# Patient Record
Sex: FEMALE | Race: WHITE | NOT HISPANIC OR LATINO | Employment: FULL TIME | URBAN - METROPOLITAN AREA
[De-identification: names, ages, dates, MRNs, and addresses within clinical notes are randomized per-mention and may not be internally consistent; named-entity substitution may affect disease eponyms.]

---

## 2017-11-30 ENCOUNTER — CONVERSION ENCOUNTER (OUTPATIENT)
Dept: MAMMOGRAPHY | Facility: HOSPITAL | Age: 52
End: 2017-11-30

## 2018-01-30 ENCOUNTER — OFFICE VISIT CONVERTED (OUTPATIENT)
Dept: CARDIOLOGY | Facility: CLINIC | Age: 53
End: 2018-01-30
Attending: SPECIALIST

## 2018-02-27 ENCOUNTER — OFFICE VISIT CONVERTED (OUTPATIENT)
Dept: FAMILY MEDICINE CLINIC | Facility: CLINIC | Age: 53
End: 2018-02-27
Attending: NURSE PRACTITIONER

## 2018-06-05 ENCOUNTER — OFFICE VISIT CONVERTED (OUTPATIENT)
Dept: CARDIOLOGY | Facility: CLINIC | Age: 53
End: 2018-06-05
Attending: SPECIALIST

## 2019-03-08 ENCOUNTER — HOSPITAL ENCOUNTER (OUTPATIENT)
Dept: LAB | Facility: HOSPITAL | Age: 54
Discharge: HOME | End: 2019-03-08
Attending: NURSE PRACTITIONER

## 2019-03-08 ENCOUNTER — OFFICE VISIT CONVERTED (OUTPATIENT)
Dept: FAMILY MEDICINE CLINIC | Facility: CLINIC | Age: 54
End: 2019-03-08
Attending: NURSE PRACTITIONER

## 2019-03-08 LAB
ALBUMIN SERPL-MCNC: 4.3 G/DL (ref 3.5–5)
ALBUMIN/GLOB SERPL: 1.5 {RATIO} (ref 1.4–2.6)
ALP SERPL-CCNC: 120 U/L (ref 53–141)
ALT SERPL-CCNC: 104 U/L (ref 10–40)
ANION GAP SERPL CALC-SCNC: 17 MMOL/L (ref 8–19)
AST SERPL-CCNC: 50 U/L (ref 15–50)
BASOPHILS # BLD AUTO: 0.02 10*3/UL (ref 0–0.2)
BASOPHILS NFR BLD AUTO: 0.3 % (ref 0–3)
BILIRUB SERPL-MCNC: 0.36 MG/DL (ref 0.2–1.3)
BUN SERPL-MCNC: 17 MG/DL (ref 5–25)
BUN/CREAT SERPL: 18 {RATIO} (ref 6–20)
CALCIUM SERPL-MCNC: 9.1 MG/DL (ref 8.7–10.4)
CHLORIDE SERPL-SCNC: 105 MMOL/L (ref 99–111)
CHOLEST SERPL-MCNC: 185 MG/DL (ref 107–200)
CHOLEST/HDLC SERPL: 3.4 {RATIO} (ref 3–6)
CONV ABS IMM GRAN: 0.03 10*3/UL (ref 0–0.2)
CONV CO2: 24 MMOL/L (ref 22–32)
CONV IMMATURE GRAN: 0.4 % (ref 0–1.8)
CONV TOTAL PROTEIN: 7.2 G/DL (ref 6.3–8.2)
CREAT UR-MCNC: 0.97 MG/DL (ref 0.5–0.9)
DEPRECATED RDW RBC AUTO: 42.2 FL (ref 36.4–46.3)
EOSINOPHIL # BLD AUTO: 0.1 10*3/UL (ref 0–0.7)
EOSINOPHIL # BLD AUTO: 1.4 % (ref 0–7)
ERYTHROCYTE [DISTWIDTH] IN BLOOD BY AUTOMATED COUNT: 12.2 % (ref 11.7–14.4)
GFR SERPLBLD BASED ON 1.73 SQ M-ARVRAT: >60 ML/MIN/{1.73_M2}
GLOBULIN UR ELPH-MCNC: 2.9 G/DL (ref 2–3.5)
GLUCOSE SERPL-MCNC: 103 MG/DL (ref 65–99)
HBA1C MFR BLD: 13.7 G/DL (ref 12–16)
HCT VFR BLD AUTO: 41.4 % (ref 37–47)
HDLC SERPL-MCNC: 54 MG/DL (ref 40–60)
LDLC SERPL CALC-MCNC: 100 MG/DL (ref 70–100)
LYMPHOCYTES # BLD AUTO: 2.67 10*3/UL (ref 1–5)
MCH RBC QN AUTO: 31 PG (ref 27–31)
MCHC RBC AUTO-ENTMCNC: 33.1 G/DL (ref 33–37)
MCV RBC AUTO: 93.7 FL (ref 81–99)
MONOCYTES # BLD AUTO: 0.62 10*3/UL (ref 0.2–1.2)
MONOCYTES NFR BLD AUTO: 8.9 % (ref 3–10)
NEUTROPHILS # BLD AUTO: 3.54 10*3/UL (ref 2–8)
NEUTROPHILS NFR BLD AUTO: 50.7 % (ref 30–85)
NRBC CBCN: 0 % (ref 0–0.7)
OSMOLALITY SERPL CALC.SUM OF ELEC: 296 MOSM/KG (ref 273–304)
PLATELET # BLD AUTO: 287 10*3/UL (ref 130–400)
PMV BLD AUTO: 10.9 FL (ref 9.4–12.3)
POTASSIUM SERPL-SCNC: 4.3 MMOL/L (ref 3.5–5.3)
RBC # BLD AUTO: 4.42 10*6/UL (ref 4.2–5.4)
SODIUM SERPL-SCNC: 142 MMOL/L (ref 135–147)
T4 FREE SERPL-MCNC: 1.1 NG/DL (ref 0.9–1.8)
TRIGL SERPL-MCNC: 155 MG/DL (ref 40–150)
TSH SERPL-ACNC: 2.38 M[IU]/L (ref 0.27–4.2)
VARIANT LYMPHS NFR BLD MANUAL: 38.3 % (ref 20–45)
VLDLC SERPL-MCNC: 31 MG/DL (ref 5–37)
WBC # BLD AUTO: 6.98 10*3/UL (ref 4.8–10.8)

## 2019-03-18 ENCOUNTER — HOSPITAL ENCOUNTER (OUTPATIENT)
Dept: LAB | Facility: HOSPITAL | Age: 54
Discharge: HOME | End: 2019-03-18
Attending: NURSE PRACTITIONER

## 2019-03-18 LAB
FERRITIN SERPL-MCNC: 53 NG/ML (ref 10–200)
INR PPP: 0.95 (ref 2–3)
IRON SATN MFR SERPL: 17 % (ref 20–55)
IRON SERPL-MCNC: 77 UG/DL (ref 60–170)
PROTHROMBIN TIME: 10 S (ref 9.4–12)
TIBC SERPL-MCNC: 458 UG/DL (ref 245–450)
TRANSFERRIN SERPL-MCNC: 320 MG/DL (ref 250–380)

## 2019-03-19 LAB
CERULOPLASMIN SERPL-MCNC: 33 MG/DL (ref 19–39)
CONV ANTI NUCLEAR ANTIBODY WITH REFLEX: NEGATIVE
CONV HEPATITIS B SURFACE AG W CONFIRMATION RE: NEGATIVE
CONV IMMUNOGLOBULIN G (IGG): 781 MG/DL (ref 700–1600)
CONV IMMUNOGLOBULIN M (IGM): 33 MG/DL (ref 26–217)
DEPRECATED MITOCHONDRIA M2 IGG SER-ACNC: <20 UNITS (ref 0–20)
GLIADIN PEPTIDE+TTG IGA+IGG SER QL IA: <1 UNITS (ref 0–20)
HBV CORE IGM SERPL QL IA: NEGATIVE
HBV E AB SERPL QL IA: NEGATIVE
HBV E AG SERPL QL IA: NEGATIVE
HBV SURFACE AB SER QL: NON REACTIVE
IGA SERPL-MCNC: 83 MG/DL (ref 87–352)
IGE SERPL-ACNC: 27 K[IU]/ML (ref 0–24)
SMOOTH MUSCLE F-ACTIN AB IGG: 3 UNITS (ref 0–19)

## 2019-03-22 LAB
A1AT SERPL-MCNC: 138 MG/DL (ref 90–200)
PHENOTYPE: NORMAL

## 2019-04-30 ENCOUNTER — HOSPITAL ENCOUNTER (OUTPATIENT)
Dept: URGENT CARE | Facility: CLINIC | Age: 54
Discharge: HOME | End: 2019-04-30
Attending: NURSE PRACTITIONER

## 2019-06-25 ENCOUNTER — OFFICE VISIT CONVERTED (OUTPATIENT)
Dept: CARDIOLOGY | Facility: CLINIC | Age: 54
End: 2019-06-25
Attending: SPECIALIST

## 2019-06-25 ENCOUNTER — CONVERSION ENCOUNTER (OUTPATIENT)
Dept: CARDIOLOGY | Facility: CLINIC | Age: 54
End: 2019-06-25

## 2019-08-21 ENCOUNTER — OFFICE VISIT CONVERTED (OUTPATIENT)
Dept: FAMILY MEDICINE CLINIC | Facility: CLINIC | Age: 54
End: 2019-08-21
Attending: NURSE PRACTITIONER

## 2021-05-11 ENCOUNTER — HOSPITAL ENCOUNTER (OUTPATIENT)
Dept: GENERAL RADIOLOGY | Facility: HOSPITAL | Age: 56
Discharge: HOME | End: 2021-05-11
Attending: PHYSICIAN ASSISTANT

## 2021-05-15 VITALS
OXYGEN SATURATION: 100 % | HEART RATE: 88 BPM | HEIGHT: 65 IN | BODY MASS INDEX: 29.37 KG/M2 | DIASTOLIC BLOOD PRESSURE: 74 MMHG | WEIGHT: 176.31 LBS | RESPIRATION RATE: 16 BRPM | TEMPERATURE: 97.2 F | SYSTOLIC BLOOD PRESSURE: 162 MMHG

## 2021-05-15 VITALS
DIASTOLIC BLOOD PRESSURE: 60 MMHG | WEIGHT: 176 LBS | HEART RATE: 90 BPM | HEIGHT: 65 IN | SYSTOLIC BLOOD PRESSURE: 126 MMHG | BODY MASS INDEX: 29.32 KG/M2

## 2021-05-15 VITALS
RESPIRATION RATE: 16 BRPM | DIASTOLIC BLOOD PRESSURE: 58 MMHG | WEIGHT: 176 LBS | OXYGEN SATURATION: 97 % | SYSTOLIC BLOOD PRESSURE: 134 MMHG | BODY MASS INDEX: 29.32 KG/M2 | HEART RATE: 75 BPM | TEMPERATURE: 96.8 F | HEIGHT: 65 IN

## 2021-05-16 VITALS
SYSTOLIC BLOOD PRESSURE: 138 MMHG | DIASTOLIC BLOOD PRESSURE: 76 MMHG | HEIGHT: 65 IN | BODY MASS INDEX: 28.82 KG/M2 | WEIGHT: 173 LBS | HEART RATE: 68 BPM

## 2021-05-16 VITALS
SYSTOLIC BLOOD PRESSURE: 134 MMHG | RESPIRATION RATE: 16 BRPM | OXYGEN SATURATION: 98 % | DIASTOLIC BLOOD PRESSURE: 50 MMHG | WEIGHT: 174.31 LBS | HEIGHT: 65 IN | HEART RATE: 73 BPM | BODY MASS INDEX: 29.04 KG/M2 | TEMPERATURE: 97 F

## 2021-05-16 VITALS
BODY MASS INDEX: 29.32 KG/M2 | DIASTOLIC BLOOD PRESSURE: 70 MMHG | SYSTOLIC BLOOD PRESSURE: 120 MMHG | HEIGHT: 65 IN | WEIGHT: 176 LBS | HEART RATE: 84 BPM

## 2021-07-05 RX ORDER — LOSARTAN POTASSIUM 25 MG/1
TABLET ORAL
Qty: 90 TABLET | Refills: 3 | Status: SHIPPED | OUTPATIENT
Start: 2021-07-05 | End: 2022-04-06 | Stop reason: ALTCHOICE

## 2021-11-11 ENCOUNTER — OFFICE VISIT (OUTPATIENT)
Dept: OBSTETRICS AND GYNECOLOGY | Facility: CLINIC | Age: 56
End: 2021-11-11

## 2021-11-11 VITALS
SYSTOLIC BLOOD PRESSURE: 146 MMHG | WEIGHT: 177.4 LBS | HEIGHT: 66 IN | BODY MASS INDEX: 28.51 KG/M2 | HEART RATE: 65 BPM | DIASTOLIC BLOOD PRESSURE: 80 MMHG

## 2021-11-11 DIAGNOSIS — N95.1 VASOMOTOR SYMPTOMS DUE TO MENOPAUSE: ICD-10-CM

## 2021-11-11 DIAGNOSIS — Z01.419 WELL WOMAN EXAM WITH ROUTINE GYNECOLOGICAL EXAM: Primary | ICD-10-CM

## 2021-11-11 DIAGNOSIS — N95.2 VAGINAL ATROPHY: ICD-10-CM

## 2021-11-11 PROCEDURE — G0123 SCREEN CERV/VAG THIN LAYER: HCPCS | Performed by: OBSTETRICS & GYNECOLOGY

## 2021-11-11 PROCEDURE — 99396 PREV VISIT EST AGE 40-64: CPT | Performed by: OBSTETRICS & GYNECOLOGY

## 2021-11-11 RX ORDER — ESTRADIOL 10 UG/1
1 INSERT VAGINAL 2 TIMES WEEKLY
Qty: 25.71 TABLET | Refills: 3 | Status: SHIPPED | OUTPATIENT
Start: 2021-11-11 | End: 2022-06-02

## 2021-11-11 RX ORDER — ASPIRIN 81 MG/1
TABLET ORAL
COMMUNITY

## 2021-11-11 RX ORDER — ESTRADIOL AND NORETHINDRONE ACETATE 1; .5 MG/1; MG/1
1 TABLET ORAL DAILY
Qty: 90 TABLET | Refills: 3 | Status: SHIPPED | OUTPATIENT
Start: 2021-11-11 | End: 2022-11-17

## 2021-11-11 RX ORDER — ESTRADIOL AND NORETHINDRONE ACETATE 1; .5 MG/1; MG/1
1 TABLET ORAL DAILY
COMMUNITY
End: 2021-11-11 | Stop reason: SDUPTHER

## 2021-11-11 RX ORDER — LOSARTAN POTASSIUM 25 MG/1
TABLET ORAL
COMMUNITY
Start: 2021-03-15 | End: 2022-04-06 | Stop reason: ALTCHOICE

## 2021-11-14 NOTE — PROGRESS NOTES
"Well Woman Visit    CC: WWE    Myriad intake: No  Previously Qualified? NO   Tobacco/Nicotine use:  No    HPI:   56 y.o. Contraception or HRT: Post menopausal    Pt has no complaints today.      History: PMHx, Meds, Allergies, PSHx, Social Hx, and POBHx all reviewed and updated.  PCP: does manage PMHx and preventative labs    Review of Systems   All other systems reviewed and are negative.       /80   Pulse 65   Ht 167.6 cm (66\")   Wt 80.5 kg (177 lb 6.4 oz)   LMP  (LMP Unknown)   Breastfeeding No   BMI 28.63 kg/m²     Physical Exam  Vitals and nursing note reviewed. Exam conducted with a chaperone present.   Constitutional:       Appearance: Normal appearance.   Neck:      Thyroid: No thyroid mass or thyromegaly.   Cardiovascular:      Rate and Rhythm: Normal rate and regular rhythm.      Heart sounds: Normal heart sounds.   Pulmonary:      Effort: Pulmonary effort is normal.      Breath sounds: Normal breath sounds.   Chest:   Breasts:      Right: Normal. No mass, nipple discharge or skin change.      Left: Normal. No mass, nipple discharge or skin change.       Abdominal:      General: Abdomen is flat. Bowel sounds are normal.      Palpations: Abdomen is soft.   Genitourinary:     General: Normal vulva.      Exam position: Lithotomy position.      Vagina: Normal.      Cervix: Normal.      Uterus: Normal.       Adnexa: Right adnexa normal and left adnexa normal.   Musculoskeletal:      Cervical back: Full passive range of motion without pain.   Neurological:      Mental Status: She is alert.         ASSESSMENT AND PLAN:  WWE    Diagnoses and all orders for this visit:    1. Well woman exam with routine gynecological exam (Primary)  -     Mammo Screening Digital Tomosynthesis Bilateral With CAD; Future  -     IGP,rfx Aptima HPV All Pth    2. Vasomotor symptoms due to menopause  Comments:  Counseled regarding the R/B of HRT, WHI study.  Pt would like to continue HRT    3. Vaginal " atrophy  Comments:  Wants to conitnue Vagifem    Other orders  -     estradiol-norethindrone (ACTIVELLA) 1-0.5 MG per tablet; Take 1 tablet by mouth Daily.  Dispense: 90 tablet; Refill: 3  -     estradiol (Vagifem) 10 MCG tablet vaginal tablet; Insert 1 tablet into the vagina 2 (Two) Times a Week.  Dispense: 25.71 tablet; Refill: 3        Counseling:     HRT R/B/A/SE/E all options including non-FDA approved options reviewed    Preventative:   MMG            She understands the importance of having any ordered tests to be performed in a timely fashion.  The risks of not performing them include, but are not limited to, advanced cancer stages, bone loss from osteoporosis and/or subsequent increase in morbidity and/or mortality.  She is encouraged to review her results online and/or contact or office if she has questions.     Follow Up:  Return in about 1 year (around 11/11/2022).        Kathy Alvarez MD  11/11/2021

## 2021-11-17 LAB
CONV .: NORMAL
CYTOLOGIST CVX/VAG CYTO: NORMAL
CYTOLOGY CVX/VAG DOC CYTO: NORMAL
CYTOLOGY CVX/VAG DOC THIN PREP: NORMAL
DX ICD CODE: NORMAL
HIV 1 & 2 AB SER-IMP: NORMAL
OTHER STN SPEC: NORMAL
STAT OF ADQ CVX/VAG CYTO-IMP: NORMAL

## 2022-01-25 ENCOUNTER — APPOINTMENT (OUTPATIENT)
Dept: MAMMOGRAPHY | Facility: HOSPITAL | Age: 57
End: 2022-01-25

## 2022-03-01 ENCOUNTER — APPOINTMENT (OUTPATIENT)
Dept: MAMMOGRAPHY | Facility: HOSPITAL | Age: 57
End: 2022-03-01

## 2022-04-04 PROBLEM — I10 ESSENTIAL HYPERTENSION: Status: ACTIVE | Noted: 2017-04-26

## 2022-04-04 RX ORDER — ATORVASTATIN CALCIUM 20 MG/1
20 TABLET, FILM COATED ORAL DAILY
COMMUNITY
End: 2022-06-02

## 2022-04-06 ENCOUNTER — OFFICE VISIT (OUTPATIENT)
Dept: FAMILY MEDICINE CLINIC | Facility: CLINIC | Age: 57
End: 2022-04-06

## 2022-04-06 VITALS
WEIGHT: 177.2 LBS | OXYGEN SATURATION: 99 % | BODY MASS INDEX: 28.48 KG/M2 | HEIGHT: 66 IN | HEART RATE: 97 BPM | TEMPERATURE: 97.7 F | SYSTOLIC BLOOD PRESSURE: 154 MMHG | DIASTOLIC BLOOD PRESSURE: 90 MMHG

## 2022-04-06 DIAGNOSIS — I10 PRIMARY HYPERTENSION: Primary | ICD-10-CM

## 2022-04-06 DIAGNOSIS — Z13.220 SCREENING FOR LIPID DISORDERS: ICD-10-CM

## 2022-04-06 PROCEDURE — 99213 OFFICE O/P EST LOW 20 MIN: CPT | Performed by: NURSE PRACTITIONER

## 2022-04-06 RX ORDER — METOPROLOL SUCCINATE 25 MG/1
25 TABLET, EXTENDED RELEASE ORAL DAILY
Qty: 90 TABLET | Refills: 1 | Status: SHIPPED | OUTPATIENT
Start: 2022-04-06 | End: 2022-09-14

## 2022-04-06 NOTE — PROGRESS NOTES
"Chief Complaint  Hypertension    Subjective          Maira Arana presents to [facility]  She presents to the office today to discuss her blood pressure.  Blood pressure on today is 154/90.  She denies any chest pain shortness breath palpitations at this time.  She states that she does not feel the losartan is doing an adequate job.  She is requesting to go back to metoprolol.  I did asked the patient to continue to log her blood pressures and let us know in 2 weeks how her blood pressure is running.  I also explained that she is due for routine lab work.      Objective   Vital Signs:   /90 (BP Location: Right arm, Patient Position: Sitting, Cuff Size: Adult)   Pulse 97   Temp 97.7 °F (36.5 °C) (Temporal)   Ht 167.6 cm (65.98\")   Wt 80.4 kg (177 lb 3.2 oz)   SpO2 99%   BMI 28.61 kg/m²     BMI is above normal parameters. Recommendations: none       Physical Exam  Vitals reviewed.   Constitutional:       Appearance: Normal appearance.   Cardiovascular:      Rate and Rhythm: Normal rate and regular rhythm.      Heart sounds: Normal heart sounds, S1 normal and S2 normal. No murmur heard.  Pulmonary:      Effort: Pulmonary effort is normal. No respiratory distress.      Breath sounds: Normal breath sounds.   Skin:     General: Skin is warm and dry.   Neurological:      Mental Status: She is alert and oriented to person, place, and time.   Psychiatric:         Attention and Perception: Attention normal.         Mood and Affect: Mood normal.         Behavior: Behavior normal.        Result Review :                Assessment and Plan    Diagnoses and all orders for this visit:    1. Primary hypertension (Primary)  -     metoprolol succinate XL (Toprol XL) 25 MG 24 hr tablet; Take 1 tablet by mouth Daily.  Dispense: 90 tablet; Refill: 1  -     CBC (No Diff); Future  -     Comprehensive Metabolic Panel; Future    2. Screening for lipid disorders  -     Lipid Panel; " Future        Follow Up   Return in about 6 months (around 10/6/2022) for Recheck.  Patient was given instructions and counseling regarding her condition or for health maintenance advice. Please see specific information pulled into the AVS if appropriate.

## 2022-04-07 ENCOUNTER — LAB (OUTPATIENT)
Dept: LAB | Facility: HOSPITAL | Age: 57
End: 2022-04-07

## 2022-04-07 DIAGNOSIS — I10 PRIMARY HYPERTENSION: ICD-10-CM

## 2022-04-07 DIAGNOSIS — Z13.220 SCREENING FOR LIPID DISORDERS: ICD-10-CM

## 2022-04-07 LAB
ALBUMIN SERPL-MCNC: 4.4 G/DL (ref 3.5–5.2)
ALBUMIN/GLOB SERPL: 1.8 G/DL
ALP SERPL-CCNC: 70 U/L (ref 39–117)
ALT SERPL W P-5'-P-CCNC: 19 U/L (ref 1–33)
ANION GAP SERPL CALCULATED.3IONS-SCNC: 9 MMOL/L (ref 5–15)
AST SERPL-CCNC: 23 U/L (ref 1–32)
BILIRUB SERPL-MCNC: 0.2 MG/DL (ref 0–1.2)
BUN SERPL-MCNC: 15 MG/DL (ref 6–20)
BUN/CREAT SERPL: 18.5 (ref 7–25)
CALCIUM SPEC-SCNC: 9.1 MG/DL (ref 8.6–10.5)
CHLORIDE SERPL-SCNC: 106 MMOL/L (ref 98–107)
CHOLEST SERPL-MCNC: 199 MG/DL (ref 0–200)
CO2 SERPL-SCNC: 24 MMOL/L (ref 22–29)
CREAT SERPL-MCNC: 0.81 MG/DL (ref 0.57–1)
DEPRECATED RDW RBC AUTO: 44.7 FL (ref 37–54)
EGFRCR SERPLBLD CKD-EPI 2021: 86.4 ML/MIN/1.73
ERYTHROCYTE [DISTWIDTH] IN BLOOD BY AUTOMATED COUNT: 13.8 % (ref 12.3–15.4)
GLOBULIN UR ELPH-MCNC: 2.5 GM/DL
GLUCOSE SERPL-MCNC: 100 MG/DL (ref 65–99)
HCT VFR BLD AUTO: 40.9 % (ref 34–46.6)
HDLC SERPL-MCNC: 45 MG/DL (ref 40–60)
HGB BLD-MCNC: 13.7 G/DL (ref 12–15.9)
LDLC SERPL CALC-MCNC: 122 MG/DL (ref 0–100)
LDLC/HDLC SERPL: 2.63 {RATIO}
MCH RBC QN AUTO: 29.7 PG (ref 26.6–33)
MCHC RBC AUTO-ENTMCNC: 33.5 G/DL (ref 31.5–35.7)
MCV RBC AUTO: 88.5 FL (ref 79–97)
PLATELET # BLD AUTO: 317 10*3/MM3 (ref 140–450)
PMV BLD AUTO: 11.4 FL (ref 6–12)
POTASSIUM SERPL-SCNC: 4.5 MMOL/L (ref 3.5–5.2)
PROT SERPL-MCNC: 6.9 G/DL (ref 6–8.5)
RBC # BLD AUTO: 4.62 10*6/MM3 (ref 3.77–5.28)
SODIUM SERPL-SCNC: 139 MMOL/L (ref 136–145)
TRIGL SERPL-MCNC: 179 MG/DL (ref 0–150)
VLDLC SERPL-MCNC: 32 MG/DL (ref 5–40)
WBC NRBC COR # BLD: 8.84 10*3/MM3 (ref 3.4–10.8)

## 2022-04-07 PROCEDURE — 80061 LIPID PANEL: CPT

## 2022-04-07 PROCEDURE — 85027 COMPLETE CBC AUTOMATED: CPT

## 2022-04-07 PROCEDURE — 36415 COLL VENOUS BLD VENIPUNCTURE: CPT

## 2022-04-07 PROCEDURE — 80053 COMPREHEN METABOLIC PANEL: CPT

## 2022-05-10 ENCOUNTER — TELEPHONE (OUTPATIENT)
Dept: OBSTETRICS AND GYNECOLOGY | Facility: CLINIC | Age: 57
End: 2022-05-10

## 2022-05-10 NOTE — TELEPHONE ENCOUNTER
Received a fax from [facility].  They state they received two Rx for patient Activella & Vagifem they are questioning if patient should be on both.  Last seen 11-11-21 Rx sent Activella #90 with 3 refills & Vagifem  25 with 3 refills.

## 2022-05-13 ENCOUNTER — APPOINTMENT (OUTPATIENT)
Dept: MAMMOGRAPHY | Facility: HOSPITAL | Age: 57
End: 2022-05-13

## 2022-06-02 ENCOUNTER — OFFICE VISIT (OUTPATIENT)
Dept: CARDIOLOGY | Facility: CLINIC | Age: 57
End: 2022-06-02

## 2022-06-02 VITALS
HEART RATE: 76 BPM | WEIGHT: 174 LBS | SYSTOLIC BLOOD PRESSURE: 198 MMHG | BODY MASS INDEX: 27.97 KG/M2 | HEIGHT: 66 IN | DIASTOLIC BLOOD PRESSURE: 92 MMHG

## 2022-06-02 DIAGNOSIS — I10 ESSENTIAL HYPERTENSION: Primary | ICD-10-CM

## 2022-06-02 DIAGNOSIS — R00.2 PALPITATIONS: ICD-10-CM

## 2022-06-02 DIAGNOSIS — R07.89 OTHER CHEST PAIN: ICD-10-CM

## 2022-06-02 PROCEDURE — 99214 OFFICE O/P EST MOD 30 MIN: CPT | Performed by: INTERNAL MEDICINE

## 2022-06-02 RX ORDER — LOSARTAN POTASSIUM 25 MG/1
TABLET ORAL
COMMUNITY
Start: 2022-04-20 | End: 2022-06-02

## 2022-06-02 RX ORDER — LOSARTAN POTASSIUM AND HYDROCHLOROTHIAZIDE 12.5; 5 MG/1; MG/1
1 TABLET ORAL DAILY
Qty: 90 TABLET | Refills: 3 | Status: SHIPPED | OUTPATIENT
Start: 2022-06-02

## 2022-06-02 NOTE — PROGRESS NOTES
"Chief Complaint  Hypertension and Rapid Heart Rate    Subjective      Patient returns clinic to follow-up on hypertension.  Her blood pressure has been quite elevated around 200 mmHg systolic.  It is associated with headache/fullness sensation in her head.  She was recently started on losartan by her PCP but she has been only taking it as needed.  In addition, she is on metoprolol XL 25 mg nightly.  She complains of occasional palpitations associated with chest tightness.  The symptoms are exacerbated by physical activities.  They are self-limited.  She has no other complaints or concerns today.      Past Medical History:   Diagnosis Date   • Cancer (HCC)    • Heart murmur    • Heart valve disease    • Hypertension          Current Outpatient Medications:   •  aspirin 81 MG EC tablet, aspirin 81 mg oral tablet,delayed release (DR/EC) take 1 tablet (81 mg) by oral route once daily   Active, Disp: , Rfl:   •  estradiol-norethindrone (ACTIVELLA) 1-0.5 MG per tablet, Take 1 tablet by mouth Daily., Disp: 90 tablet, Rfl: 3  •  metoprolol succinate XL (Toprol XL) 25 MG 24 hr tablet, Take 1 tablet by mouth Daily., Disp: 90 tablet, Rfl: 1  •  losartan-hydrochlorothiazide (Hyzaar) 50-12.5 MG per tablet, Take 1 tablet by mouth Daily., Disp: 90 tablet, Rfl: 3    Medications Discontinued During This Encounter   Medication Reason   • estradiol (Vagifem) 10 MCG tablet vaginal tablet    • atorvastatin (LIPITOR) 20 MG tablet    • losartan (COZAAR) 25 MG tablet      No Known Allergies     Social History     Tobacco Use   • Smoking status: Never Smoker   • Smokeless tobacco: Never Used   Vaping Use   • Vaping Use: Never used   Substance Use Topics   • Alcohol use: Never   • Drug use: Never       Family History   Problem Relation Age of Onset   • Polycystic kidney disease Mother    • Kidney failure Mother         Objective     BP (!) 198/92 (BP Location: Right arm)   Pulse 76   Ht 167.6 cm (66\")   Wt 78.9 kg (174 lb)   BMI 28.08 " kg/m²       Physical Exam    General Appearance:   · no acute distress  · Alert and oriented x3  HENT:   · lips not cyanotic  · Atraumatic  Neck:  · No jvd   · supple  Respiratory:  · no respiratory distress  · normal breath sounds  · no rales  Cardiovascular:  · no S3, no S4   · no murmur  · no rub  Extremities  · No cyanosis  · lower extremity edema: none    Skin:   · warm, dry  · No rashes      Result Review :     No results found for: PROBNP  CMP    CMP 4/7/22   Glucose 100 (A)   BUN 15   Creatinine 0.81   Sodium 139   Potassium 4.5   Chloride 106   Calcium 9.1   Albumin 4.40   Total Bilirubin 0.2   Alkaline Phosphatase 70   AST (SGOT) 23   ALT (SGPT) 19   (A) Abnormal value       Comments are available for some flowsheets but are not being displayed.           CBC w/diff    CBC w/Diff 4/7/22   WBC 8.84   RBC 4.62   Hemoglobin 13.7   Hematocrit 40.9   MCV 88.5   MCH 29.7   MCHC 33.5   RDW 13.8   Platelets 317            Lab Results   Component Value Date    TSH 2.380 03/08/2019      Lab Results   Component Value Date    FREET4 1.1 03/08/2019      No results found for: DDIMERQUANT  No results found for: MG   No results found for: DIGOXIN   No results found for: TROPONINT        Lipid Panel    Lipid Panel 4/7/22   Total Cholesterol 199   Triglycerides 179 (A)   HDL Cholesterol 45   VLDL Cholesterol 32   LDL Cholesterol  122 (A)   LDL/HDL Ratio 2.63   (A) Abnormal value            No results found for: POCTROP                   Diagnoses and all orders for this visit:    1. Essential hypertension (Primary)  -     losartan-hydrochlorothiazide (Hyzaar) 50-12.5 MG per tablet; Take 1 tablet by mouth Daily.  Dispense: 90 tablet; Refill: 3  -     Basic Metabolic Panel; Future    2. Palpitations  -     Holter Monitor - 48 Hour; Future    3. Other chest pain  -     Adult Transthoracic Echo Complete W/ Cont if Necessary Per Protocol; Future  -     Treadmill Stress Test; Future      Assessment:    -Hypertension: Her  medications will be adjusted as above.  BMP will be checked in 1 week.  Continue blood pressure monitoring.  We will reevaluate with her upcoming follow-up visit.    -Palpitations: She has been having palpitations associated with chest discomfort.  It is exacerbated by physical activities.  Treadmill stress test will be done to rule out exercise-induced ischemic ST-T changes/arrhythmias.  Echo will be done for assessment of LVEF, wall motion and valvular function.  48-hour Holter monitor to be done to assess for ectopy/tachyarrhythmias.  Further recommendations to follow.      Follow Up     No follow-ups on file.        Patient was given instructions and counseling regarding her condition or for health maintenance advice. Please see specific information pulled into the AVS if appropriate.

## 2022-06-07 ENCOUNTER — HOSPITAL ENCOUNTER (OUTPATIENT)
Dept: MAMMOGRAPHY | Facility: HOSPITAL | Age: 57
Discharge: HOME | End: 2022-06-07
Admitting: OBSTETRICS & GYNECOLOGY

## 2022-06-07 DIAGNOSIS — Z01.419 WELL WOMAN EXAM WITH ROUTINE GYNECOLOGICAL EXAM: ICD-10-CM

## 2022-06-07 PROCEDURE — 77067 SCR MAMMO BI INCL CAD: CPT

## 2022-06-07 PROCEDURE — 77063 BREAST TOMOSYNTHESIS BI: CPT

## 2022-06-09 ENCOUNTER — LAB (OUTPATIENT)
Dept: LAB | Facility: HOSPITAL | Age: 57
End: 2022-06-09

## 2022-06-09 DIAGNOSIS — I10 ESSENTIAL HYPERTENSION: ICD-10-CM

## 2022-06-09 LAB
ANION GAP SERPL CALCULATED.3IONS-SCNC: 11.7 MMOL/L (ref 5–15)
BUN SERPL-MCNC: 15 MG/DL (ref 6–20)
BUN/CREAT SERPL: 14.2 (ref 7–25)
CALCIUM SPEC-SCNC: 9.6 MG/DL (ref 8.6–10.5)
CHLORIDE SERPL-SCNC: 102 MMOL/L (ref 98–107)
CO2 SERPL-SCNC: 26.3 MMOL/L (ref 22–29)
CREAT SERPL-MCNC: 1.06 MG/DL (ref 0.57–1)
EGFRCR SERPLBLD CKD-EPI 2021: 62.2 ML/MIN/1.73
GLUCOSE SERPL-MCNC: 97 MG/DL (ref 65–99)
POTASSIUM SERPL-SCNC: 4.3 MMOL/L (ref 3.5–5.2)
SODIUM SERPL-SCNC: 140 MMOL/L (ref 136–145)

## 2022-06-09 PROCEDURE — 36415 COLL VENOUS BLD VENIPUNCTURE: CPT

## 2022-06-09 PROCEDURE — 80048 BASIC METABOLIC PNL TOTAL CA: CPT

## 2022-08-10 ENCOUNTER — TELEPHONE (OUTPATIENT)
Dept: CARDIOLOGY | Facility: CLINIC | Age: 57
End: 2022-08-10

## 2022-09-02 ENCOUNTER — OFFICE VISIT (OUTPATIENT)
Dept: CARDIOLOGY | Facility: CLINIC | Age: 57
End: 2022-09-02

## 2022-09-02 VITALS
SYSTOLIC BLOOD PRESSURE: 159 MMHG | HEIGHT: 66 IN | DIASTOLIC BLOOD PRESSURE: 75 MMHG | HEART RATE: 86 BPM | WEIGHT: 178.2 LBS | BODY MASS INDEX: 28.64 KG/M2

## 2022-09-02 DIAGNOSIS — I10 ESSENTIAL HYPERTENSION: ICD-10-CM

## 2022-09-02 DIAGNOSIS — R07.89 OTHER CHEST PAIN: Primary | ICD-10-CM

## 2022-09-02 DIAGNOSIS — R00.2 PALPITATIONS: ICD-10-CM

## 2022-09-02 PROCEDURE — 99214 OFFICE O/P EST MOD 30 MIN: CPT | Performed by: INTERNAL MEDICINE

## 2022-09-02 RX ORDER — AMLODIPINE BESYLATE 5 MG/1
5 TABLET ORAL DAILY
Qty: 90 TABLET | Refills: 3 | Status: SHIPPED | OUTPATIENT
Start: 2022-09-02

## 2022-09-02 NOTE — PROGRESS NOTES
"Chief Complaint  Hypertension, Leg Swelling, and Fatigue    Subjective      Patient returns clinic to follow-up on recent testing which was done for chest discomfort, palpitations and hypertension.  Her blood pressure medications were recently adjusted.  She has been doing much better since then.  Her chest discomfort and palpitations subsided.  Her blood pressure improved but remains slightly elevated.  She has no other complaints or concerns today.      Past Medical History:   Diagnosis Date   • Cancer (HCC)    • Heart murmur    • Heart valve disease    • Hypertension          Current Outpatient Medications:   •  aspirin 81 MG EC tablet, aspirin 81 mg oral tablet,delayed release (DR/EC) take 1 tablet (81 mg) by oral route once daily   Active, Disp: , Rfl:   •  estradiol-norethindrone (ACTIVELLA) 1-0.5 MG per tablet, Take 1 tablet by mouth Daily., Disp: 90 tablet, Rfl: 3  •  losartan-hydrochlorothiazide (Hyzaar) 50-12.5 MG per tablet, Take 1 tablet by mouth Daily., Disp: 90 tablet, Rfl: 3  •  metoprolol succinate XL (Toprol XL) 25 MG 24 hr tablet, Take 1 tablet by mouth Daily., Disp: 90 tablet, Rfl: 1  •  amLODIPine (NORVASC) 5 MG tablet, Take 1 tablet by mouth Daily., Disp: 90 tablet, Rfl: 3    There are no discontinued medications.  No Known Allergies     Social History     Tobacco Use   • Smoking status: Never Smoker   • Smokeless tobacco: Never Used   Vaping Use   • Vaping Use: Never used   Substance Use Topics   • Alcohol use: Never   • Drug use: Never       Family History   Problem Relation Age of Onset   • Polycystic kidney disease Mother    • Kidney failure Mother         Objective     /75   Pulse 86   Ht 167.6 cm (66\")   Wt 80.8 kg (178 lb 3.2 oz)   BMI 28.76 kg/m²       Physical Exam    General Appearance:   · no acute distress  · Alert and oriented x3  HENT:   · lips not cyanotic  · Atraumatic  Neck:  · No jvd   · supple  Respiratory:  · no respiratory distress  · normal breath sounds  · no " rales  Cardiovascular:  · no S3, no S4   · no murmur  · no rub  Extremities  · No cyanosis  · lower extremity edema: none    Skin:   · warm, dry  · No rashes      Result Review :     No results found for: PROBNP  CMP    CMP 4/7/22 6/9/22   Glucose 100 (A) 97   BUN 15 15   Creatinine 0.81 1.06 (A)   Sodium 139 140   Potassium 4.5 4.3   Chloride 106 102   Calcium 9.1 9.6   Albumin 4.40    Total Bilirubin 0.2    Alkaline Phosphatase 70    AST (SGOT) 23    ALT (SGPT) 19    (A) Abnormal value       Comments are available for some flowsheets but are not being displayed.           CBC w/diff    CBC w/Diff 4/7/22   WBC 8.84   RBC 4.62   Hemoglobin 13.7   Hematocrit 40.9   MCV 88.5   MCH 29.7   MCHC 33.5   RDW 13.8   Platelets 317            Lab Results   Component Value Date    TSH 2.380 03/08/2019      Lab Results   Component Value Date    FREET4 1.1 03/08/2019      No results found for: DDIMERQUANT  No results found for: MG   No results found for: DIGOXIN   No results found for: TROPONINT        Lipid Panel    Lipid Panel 4/7/22   Total Cholesterol 199   Triglycerides 179 (A)   HDL Cholesterol 45   VLDL Cholesterol 32   LDL Cholesterol  122 (A)   LDL/HDL Ratio 2.63   (A) Abnormal value            No results found for: POCTROP    Results for orders placed in visit on 07/26/22    Adult Transthoracic Echo Complete W/ Cont if Necessary Per Protocol    Interpretation Summary  · Left ventricular wall thickness is consistent with mild posterior asymmetric hypertrophy.  · Calculated left ventricular EF = 58% Estimated left ventricular EF was in agreement with the calculated left ventricular EF.  · Left ventricular diastolic function is consistent with (grade I) impaired relaxation.  · No hemodynamically significant valvular pathology.                 Diagnoses and all orders for this visit:    1. Other chest pain (Primary)    2. Essential hypertension  -     amLODIPine (NORVASC) 5 MG tablet; Take 1 tablet by mouth Daily.   Dispense: 90 tablet; Refill: 3    3. Palpitations      Assessment:    -Chest pain: It subsided with blood pressure control.  Treadmill stress test did not demonstrate significant ischemic ST-T changes.  Echo was mostly benign.  More likely, her chest discomfort was related to uncontrolled hypertension.  Patient was reassured.  Continue blood pressure control.    -Palpitations: Holter monitor shows sinus rhythm with rare ectopy.  Continue metoprolol.    -Hypertension: Her blood pressure remains elevated.  Amlodipine will be added to her current regimen for better blood pressure control.  Blood pressure monitoring was recommended.  She will report to us if her blood pressure remains elevated.  Goal blood pressures less than 140/90 mmHg.  Side effects of amlodipine including lower extremity edema was discussed with the patient.      Follow Up     Return in about 1 year (around 9/2/2023).        Patient was given instructions and counseling regarding her condition or for health maintenance advice. Please see specific information pulled into the AVS if appropriate.

## 2022-09-14 DIAGNOSIS — I10 PRIMARY HYPERTENSION: ICD-10-CM

## 2022-09-14 RX ORDER — METOPROLOL SUCCINATE 25 MG/1
25 TABLET, EXTENDED RELEASE ORAL DAILY
Qty: 90 TABLET | Refills: 1 | Status: SHIPPED | OUTPATIENT
Start: 2022-09-14

## 2022-11-16 DIAGNOSIS — Z30.41 FAMILY PLANNING, BCP (BIRTH CONTROL PILLS) MAINTENANCE: Primary | ICD-10-CM

## 2022-11-17 RX ORDER — ESTRADIOL AND NORETHINDRONE ACETATE 1; .5 MG/1; MG/1
1 TABLET ORAL DAILY
Qty: 84 TABLET | Refills: 0 | Status: SHIPPED | OUTPATIENT
Start: 2022-11-17 | End: 2023-02-06

## 2022-11-17 NOTE — TELEPHONE ENCOUNTER
The pharmacy is requesting refills on the patient's Activella birth control rx.  She was last seen by a provider on 11/11/21.She is scheduled with Dr. Alvarez for her well woman exam on 02/06/23.  Her mail order pharmacy only accepts refills in 90 day supply.  I have authorized a 90 day supply refill to last her until February.

## 2023-02-03 ENCOUNTER — TELEPHONE (OUTPATIENT)
Dept: OBSTETRICS AND GYNECOLOGY | Facility: CLINIC | Age: 58
End: 2023-02-03
Payer: COMMERCIAL

## 2023-02-03 DIAGNOSIS — Z30.41 FAMILY PLANNING, BCP (BIRTH CONTROL PILLS) MAINTENANCE: ICD-10-CM

## 2023-02-03 NOTE — TELEPHONE ENCOUNTER
DR BLANCHARD OUT OF THE OFFICE ON Monday.  LEFT A VM FOR THE PT TO CALL BACK TO RESCHEDULE APPT. JL

## 2023-02-06 RX ORDER — ESTRADIOL AND NORETHINDRONE ACETATE 1; .5 MG/1; MG/1
1 TABLET ORAL DAILY
Qty: 84 TABLET | Refills: 0 | Status: SHIPPED | OUTPATIENT
Start: 2023-02-06

## 2023-02-06 NOTE — TELEPHONE ENCOUNTER
Approved a 90 day supply for patients norethindrone. Pts last appt was 11/2021. She was scheduled for an appt with  today but she is now out of office so that has been rescheduled to next available which is may 2023.

## 2023-04-23 DIAGNOSIS — Z30.41 FAMILY PLANNING, BCP (BIRTH CONTROL PILLS) MAINTENANCE: ICD-10-CM

## 2023-04-24 RX ORDER — ESTRADIOL AND NORETHINDRONE ACETATE 1; .5 MG/1; MG/1
1 TABLET ORAL DAILY
Qty: 84 TABLET | Refills: 0 | Status: SHIPPED | OUTPATIENT
Start: 2023-04-24

## 2023-05-09 ENCOUNTER — TELEPHONE (OUTPATIENT)
Dept: CARDIOLOGY | Facility: CLINIC | Age: 58
End: 2023-05-09
Payer: COMMERCIAL

## 2023-05-09 DIAGNOSIS — I10 ESSENTIAL HYPERTENSION: ICD-10-CM

## 2023-05-09 NOTE — TELEPHONE ENCOUNTER
The [facility] received a fax that requires your attention. The document has been indexed to the patient’s chart for your review.      Reason for sending: NIDIA CARLOS     Documents Description: PRESCRIPTION REFILL     Name of Sender: [facility]     Date Indexed: 5.8.23

## 2023-05-09 NOTE — PROGRESS NOTES
"Well Woman Visit    CC: Scheduled annual well gyn visit  Chief Complaint   Patient presents with   • Annual Exam       Myriad intake in the past?: No    Previously Qualified? NO    Post menopausal    HPI:   58 y.o.       PCP: does manage PMHx and preventative labs  History: PMHx, Meds, Allergies, PSHx, Social Hx, and POBHx all reviewed and updated.    Pt has no complaints today.    PHYSICAL EXAM:  /79   Pulse 78   Ht 165.1 cm (65\")   Wt 81.6 kg (180 lb)   LMP  (LMP Unknown)   Breastfeeding No   BMI 29.95 kg/m²  Not found.  General- NAD, alert and oriented, appropriate  Psych- Normal mood, good memory  Neck- No masses, no thyroid enlargement  CV- Regular rhythm, no murnurs  Resp- CTA to bases, no wheezes  Abdomen- Soft, non distended, non tender, no masses    Breast left-  Bilaterally symmetrical, no masses, non tender, no nipple discharge  Breast right- Bilaterally symmetrical, no masses, non tender, no nipple discharge    External genitalia- Normal female, no lesions  Urethra/meatus- Normal, no masses, non tender  Bladder- Normal, no masses, non tender  Vagina- Normal, no atrophy, no lesions, no discharge.    Cvx- Normal, no lesions, no discharge, No cervical motion tenderness  Uterus- Normal size, shape & consistency.  Non tender, mobile, & no prolapse  Adnexa- No mass, non tender  Anus/Rectum/Perineum- Not performed    Lymphatic- No palpable neck, axillary, or groin nodes  Ext- No edema, no cyanosis    Skin- No lesions, no rashes, no acanthosis nigricans      ASSESSMENT and PLAN:    Diagnoses and all orders for this visit:    1. Well woman exam with routine gynecological exam (Primary)  -     IgP, Aptima HPV  -     Mammo Screening Digital Tomosynthesis Bilateral With CAD; Future    2. Menopausal symptoms  Assessment & Plan:  Patient wants to continue combination HRT    Orders:  -     estradiol-norethindrone (ACTIVELLA) 1-0.5 MG per tablet; Take 1 tablet by mouth Daily.  Dispense: 84 tablet; " Refill: 3      Preventative:  • BREAST HEALTH- Monthly self breast exam importance and how to reviewed. MMG and/or MRI (prn) reviewed per society guidelines and her individual history. Screen: Pt will call to schedule  • CERVICAL CANCER Screening- Reviewed current ASCCP guidelines for screening w and wo cotest HPV, age specific.  Screen: Updated today  • COLON CANCER Screening- Reviewed current medical society guidelines and options.  Screen:  Already up to date  • Follow up PCP/Specialist PMHx and Labs      She understands the importance of having any ordered tests to be performed in a timely fashion.  The risks of not performing them include, but are not limited to, advanced cancer stages, bone loss from osteoporosis and/or subsequent increase in morbidity and/or mortality.  She is encouraged to review her results online and/or contact or office if she has questions.     Follow Up:  Return in about 1 year (around 5/10/2024) for Annual physical.            Kathy Alvarez MD  05/10/2023    [facility] [place] [place] [facility]  [facility] [place] [address]  Dept: [phone]  Dept Fax: [phone]  Loc: [phone]  Loc Fax: [phone]

## 2023-05-10 ENCOUNTER — OFFICE VISIT (OUTPATIENT)
Dept: OBSTETRICS AND GYNECOLOGY | Facility: CLINIC | Age: 58
End: 2023-05-10
Payer: COMMERCIAL

## 2023-05-10 VITALS
HEART RATE: 78 BPM | WEIGHT: 180 LBS | HEIGHT: 65 IN | SYSTOLIC BLOOD PRESSURE: 141 MMHG | BODY MASS INDEX: 29.99 KG/M2 | DIASTOLIC BLOOD PRESSURE: 79 MMHG

## 2023-05-10 DIAGNOSIS — Z01.419 WELL WOMAN EXAM WITH ROUTINE GYNECOLOGICAL EXAM: Primary | ICD-10-CM

## 2023-05-10 DIAGNOSIS — N95.1 MENOPAUSAL SYMPTOMS: ICD-10-CM

## 2023-05-10 PROCEDURE — 87624 HPV HI-RISK TYP POOLED RSLT: CPT | Performed by: OBSTETRICS & GYNECOLOGY

## 2023-05-10 PROCEDURE — G0123 SCREEN CERV/VAG THIN LAYER: HCPCS | Performed by: OBSTETRICS & GYNECOLOGY

## 2023-05-10 RX ORDER — ESTRADIOL AND NORETHINDRONE ACETATE 1; .5 MG/1; MG/1
1 TABLET ORAL DAILY
Qty: 84 TABLET | Refills: 3 | Status: SHIPPED | OUTPATIENT
Start: 2023-05-10

## 2023-05-10 RX ORDER — LOSARTAN POTASSIUM AND HYDROCHLOROTHIAZIDE 12.5; 5 MG/1; MG/1
1 TABLET ORAL DAILY
Qty: 90 TABLET | Refills: 1 | Status: SHIPPED | OUTPATIENT
Start: 2023-05-10

## 2023-05-12 DIAGNOSIS — I10 PRIMARY HYPERTENSION: ICD-10-CM

## 2023-05-12 RX ORDER — METOPROLOL SUCCINATE 25 MG/1
25 TABLET, EXTENDED RELEASE ORAL DAILY
Qty: 90 TABLET | Refills: 1 | OUTPATIENT
Start: 2023-05-12

## 2023-05-15 DIAGNOSIS — I10 PRIMARY HYPERTENSION: ICD-10-CM

## 2023-05-15 RX ORDER — METOPROLOL SUCCINATE 25 MG/1
25 TABLET, EXTENDED RELEASE ORAL DAILY
Qty: 90 TABLET | Refills: 1 | OUTPATIENT
Start: 2023-05-15

## 2023-05-16 DIAGNOSIS — I10 PRIMARY HYPERTENSION: ICD-10-CM

## 2023-05-16 RX ORDER — METOPROLOL SUCCINATE 25 MG/1
25 TABLET, EXTENDED RELEASE ORAL DAILY
Qty: 30 TABLET | Refills: 0 | Status: SHIPPED | OUTPATIENT
Start: 2023-05-16 | End: 2023-05-22 | Stop reason: SDUPTHER

## 2023-05-16 NOTE — TELEPHONE ENCOUNTER
----- Message from Maira Arana sent at 5/16/2023  6:14 AM EDT -----  Regarding: Refill of Metropolol/appt request  Contact: [phone]  Please work me in for appt. As soon as possible. My heart races when I’m not on this medication, and I get severe headaches. I don’t love the medication, but this won’t work either. I feel like crud.     We have been out of town a lot this year with the back to back losses of my mother-in-law and mother, and are still getting caught up on appts. Whenever is fine.     Thanks.

## 2023-05-19 LAB
CYTOLOGIST CVX/VAG CYTO: NORMAL
CYTOLOGY CVX/VAG DOC CYTO: NORMAL
CYTOLOGY CVX/VAG DOC THIN PREP: NORMAL
DX ICD CODE: NORMAL
HIV 1 & 2 AB SER-IMP: NORMAL
HPV I/H RISK 4 DNA CVX QL PROBE+SIG AMP: NEGATIVE
Lab: NORMAL
OTHER STN SPEC: NORMAL
STAT OF ADQ CVX/VAG CYTO-IMP: NORMAL

## 2023-05-22 ENCOUNTER — LAB (OUTPATIENT)
Dept: LAB | Facility: HOSPITAL | Age: 58
End: 2023-05-22
Payer: COMMERCIAL

## 2023-05-22 ENCOUNTER — OFFICE VISIT (OUTPATIENT)
Dept: FAMILY MEDICINE CLINIC | Facility: CLINIC | Age: 58
End: 2023-05-22
Payer: COMMERCIAL

## 2023-05-22 VITALS
DIASTOLIC BLOOD PRESSURE: 84 MMHG | WEIGHT: 174.2 LBS | BODY MASS INDEX: 29.02 KG/M2 | TEMPERATURE: 97.1 F | HEART RATE: 90 BPM | HEIGHT: 65 IN | SYSTOLIC BLOOD PRESSURE: 160 MMHG | OXYGEN SATURATION: 99 %

## 2023-05-22 DIAGNOSIS — Z13.220 SCREENING FOR LIPID DISORDERS: ICD-10-CM

## 2023-05-22 DIAGNOSIS — Z00.00 ANNUAL PHYSICAL EXAM: ICD-10-CM

## 2023-05-22 DIAGNOSIS — I10 ESSENTIAL HYPERTENSION: ICD-10-CM

## 2023-05-22 DIAGNOSIS — I10 PRIMARY HYPERTENSION: ICD-10-CM

## 2023-05-22 DIAGNOSIS — I10 PRIMARY HYPERTENSION: Primary | ICD-10-CM

## 2023-05-22 LAB
ALBUMIN SERPL-MCNC: 4.5 G/DL (ref 3.5–5.2)
ALBUMIN/GLOB SERPL: 2 G/DL
ALP SERPL-CCNC: 69 U/L (ref 39–117)
ALT SERPL W P-5'-P-CCNC: 16 U/L (ref 1–33)
ANION GAP SERPL CALCULATED.3IONS-SCNC: 12.4 MMOL/L (ref 5–15)
AST SERPL-CCNC: 17 U/L (ref 1–32)
BILIRUB SERPL-MCNC: 0.4 MG/DL (ref 0–1.2)
BUN SERPL-MCNC: 20 MG/DL (ref 6–20)
BUN/CREAT SERPL: 20.6 (ref 7–25)
CALCIUM SPEC-SCNC: 9.8 MG/DL (ref 8.6–10.5)
CHLORIDE SERPL-SCNC: 103 MMOL/L (ref 98–107)
CHOLEST SERPL-MCNC: 223 MG/DL (ref 0–200)
CO2 SERPL-SCNC: 24.6 MMOL/L (ref 22–29)
CREAT SERPL-MCNC: 0.97 MG/DL (ref 0.57–1)
DEPRECATED RDW RBC AUTO: 40.3 FL (ref 37–54)
EGFRCR SERPLBLD CKD-EPI 2021: 68.7 ML/MIN/1.73
ERYTHROCYTE [DISTWIDTH] IN BLOOD BY AUTOMATED COUNT: 12.4 % (ref 12.3–15.4)
GLOBULIN UR ELPH-MCNC: 2.3 GM/DL
GLUCOSE SERPL-MCNC: 100 MG/DL (ref 65–99)
HCT VFR BLD AUTO: 39.1 % (ref 34–46.6)
HDLC SERPL-MCNC: 39 MG/DL (ref 40–60)
HGB BLD-MCNC: 13.1 G/DL (ref 12–15.9)
LDLC SERPL CALC-MCNC: 156 MG/DL (ref 0–100)
LDLC/HDLC SERPL: 3.94 {RATIO}
MCH RBC QN AUTO: 30 PG (ref 26.6–33)
MCHC RBC AUTO-ENTMCNC: 33.5 G/DL (ref 31.5–35.7)
MCV RBC AUTO: 89.5 FL (ref 79–97)
PLATELET # BLD AUTO: 309 10*3/MM3 (ref 140–450)
PMV BLD AUTO: 10.5 FL (ref 6–12)
POTASSIUM SERPL-SCNC: 3.9 MMOL/L (ref 3.5–5.2)
PROT SERPL-MCNC: 6.8 G/DL (ref 6–8.5)
RBC # BLD AUTO: 4.37 10*6/MM3 (ref 3.77–5.28)
SODIUM SERPL-SCNC: 140 MMOL/L (ref 136–145)
TRIGL SERPL-MCNC: 152 MG/DL (ref 0–150)
TSH SERPL DL<=0.05 MIU/L-ACNC: 2.22 UIU/ML (ref 0.27–4.2)
VLDLC SERPL-MCNC: 28 MG/DL (ref 5–40)
WBC NRBC COR # BLD: 7.29 10*3/MM3 (ref 3.4–10.8)

## 2023-05-22 PROCEDURE — 80050 GENERAL HEALTH PANEL: CPT

## 2023-05-22 PROCEDURE — 36415 COLL VENOUS BLD VENIPUNCTURE: CPT

## 2023-05-22 PROCEDURE — 80061 LIPID PANEL: CPT

## 2023-05-22 RX ORDER — METOPROLOL SUCCINATE 25 MG/1
25 TABLET, EXTENDED RELEASE ORAL DAILY
Qty: 90 TABLET | Refills: 1 | Status: SHIPPED | OUTPATIENT
Start: 2023-05-22

## 2023-05-22 RX ORDER — LOSARTAN POTASSIUM AND HYDROCHLOROTHIAZIDE 12.5; 5 MG/1; MG/1
1 TABLET ORAL DAILY
Qty: 90 TABLET | Refills: 1 | Status: SHIPPED | OUTPATIENT
Start: 2023-05-22

## 2023-05-22 NOTE — PROGRESS NOTES
"Chief Complaint  Annual Exam    Subjective         Maira Arana presents to [facility]  Patient presents to the office today for an annual physical.  She has not been in the office for over a year and is currently out of her blood pressure medication.  Pressure arrival today is 160/84.  She denies any chest pain shortness breath palpitations at this time.  She is also due for routine lab work.  Her mother did die of renal failure and has a history of polycystic renal disease.  I did explain that we would check her labs and at some point we would obtain a ultrasound of her kidneys to see if it was any concern of her having this as well.  Patient's mammogram is currently up-to-date as she sees GYN.  Patient has had a colonoscopy within the last 10 years which was unremarkable.  She states that this was completed in [place].  Patient's medications were refilled for her I did ask her to check her blood pressure at home to ensure that it gets back into range when she gets back on her medication.  She denies any other concerns or complaints at this time.       Objective     Vitals:    05/22/23 0848   BP: 160/84   BP Location: Right arm   Patient Position: Sitting   Cuff Size: Adult   Pulse: 90   Temp: 97.1 °F (36.2 °C)   TempSrc: Temporal   SpO2: 99%   Weight: 79 kg (174 lb 3.2 oz)   Height: 165.1 cm (65\")      Body mass index is 28.99 kg/m².    BMI is >= 25 and <30. (Overweight) The following options were offered after discussion;: nutrition counseling/recommendations        Physical Exam  Vitals reviewed.   Constitutional:       Appearance: Normal appearance.   HENT:      Head: Normocephalic and atraumatic.      Right Ear: Tympanic membrane, ear canal and external ear normal.      Left Ear: Tympanic membrane, ear canal and external ear normal.      Nose: Nose normal.      Mouth/Throat:      Mouth: Mucous membranes are moist.      Pharynx: Oropharynx is clear.   Eyes:      Extraocular " Movements: Extraocular movements intact.      Conjunctiva/sclera: Conjunctivae normal.      Pupils: Pupils are equal, round, and reactive to light.   Cardiovascular:      Rate and Rhythm: Normal rate and regular rhythm.      Pulses: Normal pulses.      Heart sounds: Normal heart sounds, S1 normal and S2 normal. No murmur heard.  Pulmonary:      Effort: Pulmonary effort is normal. No respiratory distress.      Breath sounds: Normal breath sounds.   Abdominal:      General: Abdomen is flat. Bowel sounds are normal.      Palpations: Abdomen is soft.   Musculoskeletal:         General: Normal range of motion.      Cervical back: Normal range of motion and neck supple.   Skin:     General: Skin is warm and dry.   Neurological:      General: No focal deficit present.      Mental Status: She is alert and oriented to person, place, and time.   Psychiatric:         Attention and Perception: Attention normal.         Mood and Affect: Mood normal.         Behavior: Behavior normal.          Result Review :   The following data was reviewed by: SIM Velasquez on 05/22/2023:      Procedures    Assessment and Plan   Diagnoses and all orders for this visit:    1. Primary hypertension (Primary)  -     CBC (No Diff); Future  -     Comprehensive Metabolic Panel; Future  -     Lipid Panel; Future  -     TSH; Future  -     metoprolol succinate XL (TOPROL-XL) 25 MG 24 hr tablet; Take 1 tablet by mouth Daily.  Dispense: 90 tablet; Refill: 1    2. Screening for lipid disorders  -     Lipid Panel; Future    3. Annual physical exam  -     CBC (No Diff); Future  -     Comprehensive Metabolic Panel; Future  -     Lipid Panel; Future  -     TSH; Future    4. Essential hypertension  -     losartan-hydrochlorothiazide (Hyzaar) 50-12.5 MG per tablet; Take 1 tablet by mouth Daily.  Dispense: 90 tablet; Refill: 1      Definitive counseling includes healthy diet and exercise.  Also discussed breast cancer screening and colon cancer  screening.    Follow Up   Return in about 6 months (around 11/22/2023).  Patient was given instructions and counseling regarding her condition or for health maintenance advice. Please see specific information pulled into the AVS if appropriate.       Answers for HPI/ROS submitted by the patient on 5/19/2023  What is the primary reason for your visit?: High Blood Pressure

## 2023-06-07 DIAGNOSIS — E78.5 HYPERLIPIDEMIA, UNSPECIFIED HYPERLIPIDEMIA TYPE: Primary | ICD-10-CM

## 2023-06-07 RX ORDER — ROSUVASTATIN CALCIUM 20 MG/1
20 TABLET, COATED ORAL DAILY
Qty: 90 TABLET | Refills: 1 | Status: SHIPPED | OUTPATIENT
Start: 2023-06-07

## 2023-06-09 DIAGNOSIS — I10 ESSENTIAL HYPERTENSION: ICD-10-CM

## 2023-06-09 RX ORDER — LOSARTAN POTASSIUM AND HYDROCHLOROTHIAZIDE 12.5; 5 MG/1; MG/1
1 TABLET ORAL DAILY
Qty: 90 TABLET | Refills: 1 | OUTPATIENT
Start: 2023-06-09

## 2023-08-14 ENCOUNTER — OFFICE VISIT (OUTPATIENT)
Dept: CARDIOLOGY | Facility: CLINIC | Age: 58
End: 2023-08-14
Payer: COMMERCIAL

## 2023-08-14 VITALS
WEIGHT: 177.4 LBS | HEIGHT: 65 IN | DIASTOLIC BLOOD PRESSURE: 75 MMHG | SYSTOLIC BLOOD PRESSURE: 117 MMHG | HEART RATE: 66 BPM | BODY MASS INDEX: 29.56 KG/M2

## 2023-08-14 DIAGNOSIS — I10 ESSENTIAL HYPERTENSION: Primary | ICD-10-CM

## 2023-08-14 DIAGNOSIS — R00.2 PALPITATIONS: ICD-10-CM

## 2023-08-14 PROCEDURE — 99214 OFFICE O/P EST MOD 30 MIN: CPT | Performed by: INTERNAL MEDICINE

## 2023-08-14 PROCEDURE — 93000 ELECTROCARDIOGRAM COMPLETE: CPT | Performed by: INTERNAL MEDICINE

## 2023-08-14 NOTE — PROGRESS NOTES
Chief Complaint  Hypertension and Chest Pain    Subjective      Patient returns clinic for follow-up on hypertension and palpitations.  She has been doing quite well.  She has no chest discomfort or dyspnea.  She has no significant palpitations, dizziness, presyncope or syncope.  Metoprolol has been making her feel better and is asking if it can be switched to a different medicine.      Past Medical History:   Diagnosis Date    Abnormal Pap smear of cervix     Basal cell carcinoma     Cancer     neck    Heart murmur     Heart valve disease     Hypertension          Current Outpatient Medications:     aspirin 81 MG EC tablet, aspirin 81 mg oral tablet,delayed release (DR/EC) take 1 tablet (81 mg) by oral route once daily   Active, Disp: , Rfl:     estradiol-norethindrone (ACTIVELLA) 1-0.5 MG per tablet, Take 1 tablet by mouth Daily., Disp: 84 tablet, Rfl: 3    losartan-hydrochlorothiazide (Hyzaar) 50-12.5 MG per tablet, Take 1 tablet by mouth Daily., Disp: 90 tablet, Rfl: 1    rosuvastatin (Crestor) 20 MG tablet, Take 1 tablet by mouth Daily., Disp: 90 tablet, Rfl: 1    verapamil SR (CALAN-SR) 120 MG CR tablet, Take 1 tablet by mouth Every Night., Disp: 90 tablet, Rfl: 3    Medications Discontinued During This Encounter   Medication Reason    metoprolol succinate XL (TOPROL-XL) 25 MG 24 hr tablet      No Known Allergies     Social History     Tobacco Use    Smoking status: Never     Passive exposure: Never    Smokeless tobacco: Never    Tobacco comments:     Yuk   Vaping Use    Vaping Use: Never used   Substance Use Topics    Alcohol use: Never    Drug use: Never       Family History   Problem Relation Age of Onset    Polycystic kidney disease Mother     Kidney failure Mother     Kidney disease Mother         End stage renal failure    Colon cancer Maternal Grandfather     Breast cancer Neg Hx     Ovarian cancer Neg Hx     Uterine cancer Neg Hx     Deep vein thrombosis Neg Hx     Pulmonary embolism Neg Hx      "    Objective     /75   Pulse 66   Ht 165.1 cm (65\")   Wt 80.5 kg (177 lb 6.4 oz)   BMI 29.52 kg/mý       Physical Exam    General Appearance:   no acute distress  Alert and oriented x3  HENT:   lips not cyanotic  Atraumatic  Neck:  No jvd   supple  Respiratory:  no respiratory distress  normal breath sounds  no rales  Cardiovascular:  no S3, no S4   no murmur  no rub  Extremities  No cyanosis  lower extremity edema: none    Skin:   warm, dry  No rashes      Result Review :     No results found for: PROBNP  CMP          5/22/2023    09:28   CMP   Glucose 100    BUN 20    Creatinine 0.97    EGFR 68.7    Sodium 140    Potassium 3.9    Chloride 103    Calcium 9.8    Total Protein 6.8    Albumin 4.5    Globulin 2.3    Total Bilirubin 0.4    Alkaline Phosphatase 69    AST (SGOT) 17    ALT (SGPT) 16    Albumin/Globulin Ratio 2.0    BUN/Creatinine Ratio 20.6    Anion Gap 12.4      CBC w/diff          5/22/2023    09:28   CBC w/Diff   WBC 7.29    RBC 4.37    Hemoglobin 13.1    Hematocrit 39.1    MCV 89.5    MCH 30.0    MCHC 33.5    RDW 12.4    Platelets 309       Lab Results   Component Value Date    TSH 2.220 05/22/2023      Lab Results   Component Value Date    FREET4 1.1 03/08/2019      No results found for: DDIMERQUANT  No results found for: MG   No results found for: DIGOXIN   No results found for: TROPONINT        Lipid Panel          5/22/2023    09:28   Lipid Panel   Total Cholesterol 223    Triglycerides 152    HDL Cholesterol 39    VLDL Cholesterol 28    LDL Cholesterol  156    LDL/HDL Ratio 3.94      No results found for: POCTROP    Results for orders placed in visit on 07/26/22    Adult Transthoracic Echo Complete W/ Cont if Necessary Per Protocol    Interpretation Summary  ú Left ventricular wall thickness is consistent with mild posterior asymmetric hypertrophy.  ú Calculated left ventricular EF = 58% Estimated left ventricular EF was in agreement with the calculated left ventricular EF.  ú Left " ventricular diastolic function is consistent with (grade I) impaired relaxation.  ú No hemodynamically significant valvular pathology.       ECG 12 Lead    Date/Time: 8/14/2023 9:55 AM  Performed by: Ana Novak MD  Authorized by: Ana Novak MD   Comparison: compared with previous ECG   Similar to previous ECG  Rhythm: sinus rhythm  BPM: 63               Diagnoses and all orders for this visit:    1. Essential hypertension (Primary)    2. Palpitations    Other orders  -     verapamil SR (CALAN-SR) 120 MG CR tablet; Take 1 tablet by mouth Every Night.  Dispense: 90 tablet; Refill: 3        Assessment:    Essential hypertension: Her blood pressure has been stable.  However, she reports fatigue and tiredness related to metoprolol.  This medication will be switched to verapamil  mg daily.  Continue losartan/HCTZ.  Continue blood pressure monitoring.    Palpitations: Previous Holter monitor demonstrated sinus rhythm with rare ectopy.  She has done well on metoprolol but has been having fatigue as discussed above.  This medicine will be switched to verapamil.    Follow Up     Return in about 1 year (around 8/14/2024) for with Dr Novak.        Patient was given instructions and counseling regarding her condition or for health maintenance advice. Please see specific information pulled into the AVS if appropriate.

## 2023-09-22 DIAGNOSIS — I10 PRIMARY HYPERTENSION: ICD-10-CM

## 2023-09-25 RX ORDER — METOPROLOL SUCCINATE 25 MG/1
25 TABLET, EXTENDED RELEASE ORAL DAILY
Qty: 90 TABLET | Refills: 3 | OUTPATIENT
Start: 2023-09-25

## 2023-12-11 DIAGNOSIS — E78.5 HYPERLIPIDEMIA, UNSPECIFIED HYPERLIPIDEMIA TYPE: ICD-10-CM

## 2023-12-11 RX ORDER — ROSUVASTATIN CALCIUM 20 MG/1
20 TABLET, COATED ORAL DAILY
Qty: 90 TABLET | Refills: 3 | Status: SHIPPED | OUTPATIENT
Start: 2023-12-11

## 2024-03-30 DIAGNOSIS — I10 ESSENTIAL HYPERTENSION: ICD-10-CM

## 2024-04-01 RX ORDER — LOSARTAN POTASSIUM AND HYDROCHLOROTHIAZIDE 12.5; 5 MG/1; MG/1
1 TABLET ORAL DAILY
Qty: 90 TABLET | Refills: 3 | Status: SHIPPED | OUTPATIENT
Start: 2024-04-01

## 2024-06-26 DIAGNOSIS — N95.1 MENOPAUSAL SYMPTOMS: ICD-10-CM

## 2024-06-26 RX ORDER — ESTRADIOL AND NORETHINDRONE ACETATE 1; .5 MG/1; MG/1
1 TABLET ORAL DAILY
Qty: 84 TABLET | Refills: 0 | Status: SHIPPED | OUTPATIENT
Start: 2024-06-26

## 2024-08-30 RX ORDER — VERAPAMIL HYDROCHLORIDE 120 MG/1
120 TABLET, FILM COATED, EXTENDED RELEASE ORAL NIGHTLY
Qty: 90 TABLET | Refills: 0 | Status: SHIPPED | OUTPATIENT
Start: 2024-08-30

## 2024-08-30 NOTE — TELEPHONE ENCOUNTER
PT IS REQUESTING A REFILL ON VERAPAMIL BE SENT TO [facility].  DR CARLOS PREVIOUS PRESCRIBER.

## 2024-09-05 ENCOUNTER — OFFICE VISIT (OUTPATIENT)
Dept: CARDIOLOGY | Facility: CLINIC | Age: 59
End: 2024-09-05
Payer: COMMERCIAL

## 2024-09-05 VITALS
WEIGHT: 182 LBS | SYSTOLIC BLOOD PRESSURE: 170 MMHG | DIASTOLIC BLOOD PRESSURE: 81 MMHG | HEIGHT: 65 IN | BODY MASS INDEX: 30.32 KG/M2 | HEART RATE: 86 BPM

## 2024-09-05 DIAGNOSIS — I10 ESSENTIAL HYPERTENSION: Primary | ICD-10-CM

## 2024-09-05 DIAGNOSIS — R00.2 PALPITATIONS: ICD-10-CM

## 2024-09-05 DIAGNOSIS — E78.5 DYSLIPIDEMIA: ICD-10-CM

## 2024-09-05 PROCEDURE — 99214 OFFICE O/P EST MOD 30 MIN: CPT | Performed by: NURSE PRACTITIONER

## 2024-09-05 RX ORDER — VERAPAMIL HYDROCHLORIDE 120 MG/1
120 TABLET, FILM COATED, EXTENDED RELEASE ORAL NIGHTLY
Qty: 90 TABLET | Refills: 3 | Status: SHIPPED | OUTPATIENT
Start: 2024-09-05

## 2024-09-05 NOTE — PROGRESS NOTES
Chief Complaint  Hypertension and Edema    Subjective            Maira Arana presents to [facility]  History of Present Illness    Maira is here for follow-up evaluation management of essential hypertension, palpitations, dyslipidemia.  Her palpitations are well-controlled on verapamil.  She denies chest pain or excessive shortness of breath.  She has intermittent lower extremity swelling, typically after being in her desk all day.  She is not routinely monitoring her blood pressure at home but has felt well.  She has been out of her verapamil the last few days and just took her losartan/HCTZ prior to arrival to the office today.  Her blood pressure is high.    PMH  Past Medical History:   Diagnosis Date    Abnormal Pap smear of cervix     Basal cell carcinoma     Cancer     neck    Chronic kidney disease     I don't have this, but my mother has polycystic kidney desease and is in renal failure. She didn't know she had it at this age. Quite hereditary, so I'm noting it.    Heart murmur     Heart valve disease     Hyperlipidemia     Hypertension          SURGICALHX  Past Surgical History:   Procedure Laterality Date    ADENOIDECTOMY  1981    COLONOSCOPY  2016    OVARIAN CYST REMOVAL  1994    SKIN BIOPSY      basal cell    TONSILLECTOMY  1981    TUBAL ABDOMINAL LIGATION  1994    TUMOR REMOVAL  2001    malignant        SOC  Social History     Socioeconomic History    Marital status:    Tobacco Use    Smoking status: Never     Passive exposure: Never    Smokeless tobacco: Never    Tobacco comments:     Yuk   Vaping Use    Vaping status: Never Used   Substance and Sexual Activity    Alcohol use: Never    Drug use: Never    Sexual activity: Yes     Partners: Male     Birth control/protection: Post-menopausal, Tubal ligation     Comment: . Did I answer that right?         FAMHX  Family History   Problem Relation Age of Onset    Polycystic kidney disease Mother     Kidney failure  "Mother     Kidney disease Mother         End stage renal failure    Colon cancer Maternal Grandfather     Breast cancer Neg Hx     Ovarian cancer Neg Hx     Uterine cancer Neg Hx     Deep vein thrombosis Neg Hx     Pulmonary embolism Neg Hx           ALLERGY  No Known Allergies     MEDSCURRENT    Current Outpatient Medications:     aspirin 81 MG EC tablet, aspirin 81 mg oral tablet,delayed release (DR/EC) take 1 tablet (81 mg) by oral route once daily   Active, Disp: , Rfl:     estradiol-norethindrone (ACTIVELLA) 1-0.5 MG per tablet, TAKE 1 TABLET BY MOUTH DAILY, Disp: 84 tablet, Rfl: 0    losartan-hydrochlorothiazide (HYZAAR) 50-12.5 MG per tablet, TAKE 1 TABLET BY MOUTH DAILY, Disp: 90 tablet, Rfl: 3    verapamil SR (CALAN-SR) 120 MG CR tablet, Take 1 tablet by mouth Every Night., Disp: 90 tablet, Rfl: 3    rosuvastatin (CRESTOR) 20 MG tablet, TAKE 1 TABLET BY MOUTH DAILY, Disp: 90 tablet, Rfl: 3      Review of Systems   Cardiovascular:  Negative for chest pain, dyspnea on exertion, irregular heartbeat, palpitations and syncope.        Objective     /81   Pulse 86   Ht 165.1 cm (65\")   Wt 82.6 kg (182 lb)   BMI 30.29 kg/m²       General Appearance:   well developed  well nourished  HENT:   oropharynx moist  lips not cyanotic  Neck:  thyroid not enlarged  supple  Respiratory:  no respiratory distress  normal breath sounds  no rales  Cardiovascular:  no jugular venous distention  regular rhythm  apical impulse normal  S1 normal, S2 normal  no S3, no S4   no murmur  no rub, no thrill  carotid pulses normal; no bruit  pedal pulses normal  lower extremity edema: none    Musculoskeletal:  no clubbing of fingers.   normocephalic, head atraumatic  Skin:   warm, dry  Psychiatric:  judgement and insight appropriate  normal mood and affect      Result Review :     The following data was reviewed by: SIM Lebron on 09/05/2024:              Data reviewed : Primary care notes reviewed.  Previous " cardiac studies reviewed.      Procedures      Maira Arana  reports that she has never smoked. She has never been exposed to tobacco smoke. She has never used smokeless tobacco. I have educated her on the risk of diseases from using tobacco products such as cancer, COPD, and heart disease.              Assessment and Plan        ASSESSMENT:  Encounter Diagnoses   Name Primary?    Dyslipidemia     Essential hypertension Yes    Palpitations          PLAN:    1.  Essential hypertension, uncontrolled today.  However she has been out of her verapamil the last 4 days and just took her losartan/HCTZ.  Will have her keep a blood pressure log for 2 weeks and send me a [employer] message on the average.  Will make adjustments if necessary.  2.  Palpitations, well-controlled on verapamil, refill sent.  Continue.  3.  Dyslipidemia, repeating lipid panel today.  She has stopped Crestor.  Will call with results once available and recommendation on reinitiating statin therapy if necessary.    Follow-up annually.      Patient was given instructions and counseling regarding her condition or for health maintenance advice. Please see specific information pulled into the AVS if appropriate.           Halina Morgan, APRN   9/5/2024  09:06 EDT

## 2024-09-06 ENCOUNTER — LAB (OUTPATIENT)
Dept: LAB | Facility: HOSPITAL | Age: 59
End: 2024-09-06
Payer: COMMERCIAL

## 2024-09-06 DIAGNOSIS — E78.5 DYSLIPIDEMIA: ICD-10-CM

## 2024-09-06 LAB
CHOLEST SERPL-MCNC: 197 MG/DL (ref 0–200)
HDLC SERPL-MCNC: 45 MG/DL (ref 40–60)
LDLC SERPL CALC-MCNC: 123 MG/DL (ref 0–100)
LDLC/HDLC SERPL: 2.65 {RATIO}
TRIGL SERPL-MCNC: 163 MG/DL (ref 0–150)
VLDLC SERPL-MCNC: 29 MG/DL (ref 5–40)

## 2024-09-06 PROCEDURE — 80061 LIPID PANEL: CPT

## 2024-09-06 PROCEDURE — 36415 COLL VENOUS BLD VENIPUNCTURE: CPT

## 2024-09-09 DIAGNOSIS — E78.5 HYPERLIPIDEMIA, UNSPECIFIED HYPERLIPIDEMIA TYPE: ICD-10-CM

## 2024-09-09 RX ORDER — ROSUVASTATIN CALCIUM 20 MG/1
20 TABLET, COATED ORAL DAILY
Qty: 90 TABLET | Refills: 3 | Status: SHIPPED | OUTPATIENT
Start: 2024-09-09

## 2024-11-20 DIAGNOSIS — N95.1 MENOPAUSAL SYMPTOMS: ICD-10-CM

## 2024-11-20 RX ORDER — ESTRADIOL AND NORETHINDRONE ACETATE 1; .5 MG/1; MG/1
1 TABLET ORAL DAILY
Qty: 84 TABLET | Refills: 0 | OUTPATIENT
Start: 2024-11-20

## 2024-11-20 NOTE — TELEPHONE ENCOUNTER
Denied refill on Activella x 1 # 84.  Last seen 5/10/23.  No appointment scheduled. Pt need annual

## 2024-11-26 DIAGNOSIS — I10 ESSENTIAL HYPERTENSION: ICD-10-CM

## 2024-11-26 RX ORDER — HYDROCHLOROTHIAZIDE 12.5 MG/1
12.5 CAPSULE ORAL DAILY
Qty: 90 CAPSULE | Refills: 3 | Status: SHIPPED | OUTPATIENT
Start: 2024-11-26

## 2024-11-26 RX ORDER — LOSARTAN POTASSIUM AND HYDROCHLOROTHIAZIDE 12.5; 5 MG/1; MG/1
1 TABLET ORAL DAILY
Qty: 90 TABLET | Refills: 3 | Status: CANCELLED | OUTPATIENT
Start: 2024-11-26

## 2024-11-26 RX ORDER — LOSARTAN POTASSIUM 50 MG/1
75 TABLET ORAL DAILY
Qty: 135 TABLET | Refills: 3 | Status: SHIPPED | OUTPATIENT
Start: 2024-11-26

## 2024-12-20 ENCOUNTER — TELEPHONE (OUTPATIENT)
Dept: OBSTETRICS AND GYNECOLOGY | Facility: CLINIC | Age: 59
End: 2024-12-20
Payer: COMMERCIAL

## 2024-12-20 DIAGNOSIS — N95.1 MENOPAUSAL SYMPTOMS: ICD-10-CM

## 2024-12-20 RX ORDER — ESTRADIOL AND NORETHINDRONE ACETATE 1; .5 MG/1; MG/1
1 TABLET ORAL DAILY
Qty: 84 TABLET | Refills: 0 | OUTPATIENT
Start: 2024-12-20

## 2024-12-20 RX ORDER — ESTRADIOL AND NORETHINDRONE ACETATE 1; .5 MG/1; MG/1
1 TABLET ORAL DAILY
Qty: 84 TABLET | Refills: 1 | Status: SHIPPED | OUTPATIENT
Start: 2024-12-20

## 2024-12-20 NOTE — TELEPHONE ENCOUNTER
Caller: Maira Arana    Relationship: Self    Best call back number: [phone]    Requested Prescriptions:   Requested Prescriptions     Pending Prescriptions Disp Refills    estradiol-norethindrone (ACTIVELLA) 1-0.5 MG per tablet 84 tablet 0     Sig: Take 1 tablet by mouth Daily.        Pharmacy where request should be sent:  [facility]    Last office visit with prescribing clinician: 5/10/2023   Last telemedicine visit with prescribing clinician: Visit date not found   Next office visit with prescribing clinician: PT DOES NOT WANT TO SCHEDULE AN ANNUAL UNLESS SHE HAS TO TO GET THIS PRESCRIPTION    Additional details provided by patient: PT IS OUT OF THE MED    Does the patient have less than a 3 day supply:  [x] Yes  [] No    Would you like a call back once the refill request has been completed: [x] Yes [] No    If the office needs to give you a call back, can they leave a voicemail: [x] Yes [] No    Tyler Norman Rep   12/20/24 12:59 EST

## 2024-12-20 NOTE — TELEPHONE ENCOUNTER
Caller: Maira Arana    Relationship: Self    Best call back number: [phone]    What orders are you requesting (i.e. lab or imaging): MAMMO NO IMPLANTS     In what timeframe would the patient need to come in: LAST 06-20-23    Where will you receive your lab/imaging services: [facility]    Additional notes:

## 2024-12-20 NOTE — TELEPHONE ENCOUNTER
Patient is requesting a refill on estradiol-norethindrone. She  has made an appointment for her annual exam. She is going to call central scheduling to arrange her screening mammogram.

## 2024-12-20 NOTE — TELEPHONE ENCOUNTER
Patient has scheduled annual visit, approved refill on Activella #84 R-1  to get through until her appointment.

## 2024-12-20 NOTE — TELEPHONE ENCOUNTER
Denied refill on Activella x 1 # 84.  Last seen 5/10/23.  No appointment scheduled. Pt need annual

## 2025-05-03 DIAGNOSIS — N95.1 MENOPAUSAL SYMPTOMS: ICD-10-CM

## 2025-05-05 RX ORDER — ESTRADIOL AND NORETHINDRONE ACETATE 1; .5 MG/1; MG/1
1 TABLET ORAL DAILY
Qty: 84 TABLET | Refills: 3 | OUTPATIENT
Start: 2025-05-05

## 2025-05-06 NOTE — PROGRESS NOTES
"Well Woman Visit    CC: Scheduled annual well gyn visit  Chief Complaint   Patient presents with    Annual Exam         Post menopausal, using HRT    Last Completed Pap Smear            Upcoming       PAP SMEAR (Every 3 Years) Next due on 5/10/2026      05/10/2023  IgP, Aptima HPV    2021  IGP,rfx Aptima HPV All Pth                           Last Completed Mammogram    This patient has no relevant Health Maintenance data.          HPI:   60 y.o.     History of Present Illness  The patient presents for a well-woman exam.    She reports no significant health concerns at this time. Hormone therapy is currently being used to manage menopausal symptoms, which she finds beneficial. Despite this, she continues to experience hot flashes, tachycardia, and occasional hypertension, which she suspects may be hormonally induced. Comprehensive cardiac evaluations have not determined the cause of these symptoms. She is due for her mammogram, and her Pap smear is up to date until next year.  SOCIAL HISTORY  She works at a college.   PCP: does manage PMHx and preventative labs  History: PMHx, Meds, Allergies, PSHx, Social Hx, and POBHx all reviewed and updated.    PHYSICAL EXAM:  /78   Pulse 81   Ht 165.1 cm (65\")   Wt 80.3 kg (177 lb)   LMP  (LMP Unknown)   Breastfeeding No   BMI 29.45 kg/m²  Not found.  General- NAD, alert and oriented, appropriate  Psych- Normal mood, good memory  Neck- No masses, no thyroid enlargement  CV- Regular rhythm, no murnurs  Resp- CTA to bases, no wheezes  Abdomen- Soft, non distended, non tender, no masses  Breast left-  Bilaterally symmetrical, no masses, non tender, no nipple discharge  Breast right- Bilaterally symmetrical, no masses, non tender, no nipple discharge  External genitalia- Normal female, no lesions  Urethra/meatus- Normal, no masses, non tender  Bladder- Normal, no masses, non tender  Vagina- Normal, no atrophy, no lesions, no discharge.    Cvx- Normal, no " lesions, no discharge, No cervical motion tenderness  Uterus- Normal size, shape & consistency.  Non tender, mobile.  Adnexa- No mass, non tender  Anus/Rectum/Perineum- Not performed  Lymphatic- No palpable neck, axillary, or groin nodes  Ext- No edema, no cyanosis    Skin- No lesions, no rashes, no acanthosis nigricans      ASSESSMENT and PLAN:    Diagnoses and all orders for this visit:    1. Well woman exam with routine gynecological exam (Primary)  -     Mammo Screening Digital Tomosynthesis Bilateral With CAD    2. Menopausal symptoms  -     estradiol-norethindrone (ACTIVELLA) 1-0.5 MG per tablet; Take 1 tablet by mouth Daily.  Dispense: 84 tablet; Refill: 1        Assessment & Plan  1. Well-woman exam  - Pap smear current until next year  - Colonoscopy up to date, next due in 01/2026  - Mammogram ordered  - Hormone therapy prescription refilled    Follow-up  Follow up in 1 year or sooner if needed.       Preventative:      She understands the importance of having any ordered tests to be performed in a timely fashion.  The risks of not performing them include, but are not limited to, advanced cancer stages, bone loss from osteoporosis and/or subsequent increase in morbidity and/or mortality.  She is encouraged to review her results online and/or contact or office if she has questions.     Follow Up:  Return in about 1 year (around 5/7/2026) for Annual physical.            Kathy Alvarez MD  05/07/2025    [facility] [address] [place] [address]  Dept: [phone]  Dept Fax: [phone]  Loc: [phone]  Loc Fax: [phone]     Patient or patient representative verbalized consent for the use of Ambient Listening during the visit with  Kathy Alvarez MD for chart documentation. 5/7/2025  09:44 EDT

## 2025-05-07 ENCOUNTER — OFFICE VISIT (OUTPATIENT)
Dept: OBSTETRICS AND GYNECOLOGY | Age: 60
End: 2025-05-07
Payer: COMMERCIAL

## 2025-05-07 VITALS
HEIGHT: 65 IN | HEART RATE: 81 BPM | DIASTOLIC BLOOD PRESSURE: 78 MMHG | WEIGHT: 177 LBS | BODY MASS INDEX: 29.49 KG/M2 | SYSTOLIC BLOOD PRESSURE: 149 MMHG

## 2025-05-07 DIAGNOSIS — N95.1 MENOPAUSAL SYMPTOMS: ICD-10-CM

## 2025-05-07 DIAGNOSIS — Z01.419 WELL WOMAN EXAM WITH ROUTINE GYNECOLOGICAL EXAM: Primary | ICD-10-CM

## 2025-05-07 RX ORDER — ESTRADIOL AND NORETHINDRONE ACETATE 1; .5 MG/1; MG/1
1 TABLET ORAL DAILY
Qty: 84 TABLET | Refills: 1 | Status: SHIPPED | OUTPATIENT
Start: 2025-05-07

## 2025-05-07 RX ORDER — LOSARTAN POTASSIUM AND HYDROCHLOROTHIAZIDE 12.5; 5 MG/1; MG/1
TABLET ORAL
COMMUNITY
Start: 2025-02-05 | End: 2025-05-07

## 2025-06-16 ENCOUNTER — HOSPITAL ENCOUNTER (OUTPATIENT)
Dept: MAMMOGRAPHY | Facility: HOSPITAL | Age: 60
Discharge: HOME | End: 2025-06-16
Admitting: OBSTETRICS & GYNECOLOGY
Payer: COMMERCIAL

## 2025-06-16 PROCEDURE — 77063 BREAST TOMOSYNTHESIS BI: CPT

## 2025-06-16 PROCEDURE — 77067 SCR MAMMO BI INCL CAD: CPT

## 2025-06-17 DIAGNOSIS — R92.8 ABNORMAL MAMMOGRAM: Primary | ICD-10-CM

## 2025-06-18 ENCOUNTER — PATIENT MESSAGE (OUTPATIENT)
Dept: OBSTETRICS AND GYNECOLOGY | Age: 60
End: 2025-06-18
Payer: COMMERCIAL

## 2025-06-18 DIAGNOSIS — R92.8 ABNORMAL MAMMOGRAM: Primary | ICD-10-CM

## 2025-06-18 NOTE — TELEPHONE ENCOUNTER
Messaged pt to let her know orders for additional imaging have been placed. Gave pt contact info, [phone], press option #3 to have it scheduled.

## 2025-06-25 ENCOUNTER — HOSPITAL ENCOUNTER (OUTPATIENT)
Dept: ULTRASOUND IMAGING | Facility: HOSPITAL | Age: 60
Discharge: HOME | End: 2025-06-25
Payer: COMMERCIAL

## 2025-06-25 ENCOUNTER — HOSPITAL ENCOUNTER (OUTPATIENT)
Dept: MAMMOGRAPHY | Facility: HOSPITAL | Age: 60
Discharge: HOME | End: 2025-06-25
Payer: COMMERCIAL

## 2025-06-25 DIAGNOSIS — R92.8 ABNORMAL MAMMOGRAM: ICD-10-CM

## 2025-06-25 PROCEDURE — G0279 TOMOSYNTHESIS, MAMMO: HCPCS

## 2025-06-25 PROCEDURE — 77065 DX MAMMO INCL CAD UNI: CPT

## 2025-06-25 PROCEDURE — 76642 ULTRASOUND BREAST LIMITED: CPT

## 2025-07-03 ENCOUNTER — PATIENT OUTREACH (OUTPATIENT)
Dept: ONCOLOGY | Facility: HOSPITAL | Age: 60
End: 2025-07-03
Payer: COMMERCIAL

## 2025-07-03 ENCOUNTER — HOSPITAL ENCOUNTER (OUTPATIENT)
Dept: MAMMOGRAPHY | Facility: HOSPITAL | Age: 60
Discharge: HOME | End: 2025-07-03
Payer: COMMERCIAL

## 2025-07-03 DIAGNOSIS — R92.8 ABNORMAL MAMMOGRAM: ICD-10-CM

## 2025-07-03 PROCEDURE — A4648 IMPLANTABLE TISSUE MARKER: HCPCS

## 2025-07-03 PROCEDURE — 76098 X-RAY EXAM SURGICAL SPECIMEN: CPT

## 2025-07-03 PROCEDURE — C1819 TISSUE LOCALIZATION-EXCISION: HCPCS

## 2025-07-03 PROCEDURE — 25010000002 LIDOCAINE 1% - EPINEPHRINE 1:100000 1 %-1:100000 SOLUTION

## 2025-07-03 PROCEDURE — 25010000002 LIDOCAINE 1 % SOLUTION

## 2025-07-03 RX ORDER — DIAPER,BRIEF,INFANT-TODD,DISP
EACH MISCELLANEOUS
Status: COMPLETED
Start: 2025-07-03 | End: 2025-07-03

## 2025-07-03 RX ORDER — LIDOCAINE HYDROCHLORIDE 10 MG/ML
INJECTION, SOLUTION INFILTRATION; PERINEURAL
Status: COMPLETED
Start: 2025-07-03 | End: 2025-07-03

## 2025-07-03 RX ORDER — LIDOCAINE HYDROCHLORIDE AND EPINEPHRINE 10; 10 MG/ML; UG/ML
INJECTION, SOLUTION INFILTRATION; PERINEURAL
Status: COMPLETED
Start: 2025-07-03 | End: 2025-07-03

## 2025-07-03 RX ADMIN — LIDOCAINE HYDROCHLORIDE: 10 INJECTION, SOLUTION INFILTRATION; PERINEURAL at 10:52

## 2025-07-03 RX ADMIN — BACITRACIN: 500 OINTMENT TOPICAL at 10:52

## 2025-07-03 RX ADMIN — LIDOCAINE HYDROCHLORIDE,EPINEPHRINE BITARTRATE: 10; .01 INJECTION, SOLUTION INFILTRATION; PERINEURAL at 10:52

## 2025-07-08 LAB
CYTO UR: NORMAL
LAB AP CASE REPORT: NORMAL
LAB AP CLINICAL INFORMATION: NORMAL
PATH REPORT.FINAL DX SPEC: NORMAL
PATH REPORT.GROSS SPEC: NORMAL

## 2025-07-09 DIAGNOSIS — N64.89 PSEUDOANGIOMATOUS STROMAL HYPERPLASIA OF BREAST: Primary | ICD-10-CM

## 2025-07-23 ENCOUNTER — TELEPHONE (OUTPATIENT)
Dept: SURGERY | Facility: CLINIC | Age: 60
End: 2025-07-23
Payer: COMMERCIAL

## 2025-07-23 NOTE — TELEPHONE ENCOUNTER
New patient appointment scheduled with   9/2/2025 9:40 AM Binta Mccarty APRN     (Referred by Kathy Alvarez MD for [facility])    Arrival 15 minutes prior to appointment time.     New patient packet mailed. Left VM for patient to call back and confirm appointment date and time.